# Patient Record
(demographics unavailable — no encounter records)

---

## 2025-06-11 NOTE — PHYSICAL EXAM
[PERRL] : pupils equal round and reactive to light [Normal Oropharynx] : the oropharynx was normal [Supple] : supple [Clear to Auscultation] : lungs were clear to auscultation bilaterally [Regular Rhythm] : with a regular rhythm [No Murmur] : no murmur heard [No Edema] : there was no peripheral edema [Normal] : no joint swelling and grossly normal strength and tone [No Focal Deficits] : no focal deficits [Normal Insight/Judgement] : insight and judgment were intact [de-identified] : splinter  rt  thumb  removed under local

## 2025-06-11 NOTE — HISTORY OF PRESENT ILLNESS
[FreeTextEntry1] : pt here for management of HTN HLD ASTHMA  [de-identified] : FEELING WELL NO CP NO SOB USING RESCUE PRN

## 2025-06-11 NOTE — ASSESSMENT
[FreeTextEntry1] : BP ACCEPTABLE  CONTINUE ALDACTOONE AMLODIPINE AND CARVEDILOL HLD  CONTINUE ATORVATATIN ASTHNA RENEW XOPENEX